# Patient Record
Sex: MALE | Race: WHITE
[De-identification: names, ages, dates, MRNs, and addresses within clinical notes are randomized per-mention and may not be internally consistent; named-entity substitution may affect disease eponyms.]

---

## 2018-11-07 ENCOUNTER — HOSPITAL ENCOUNTER (INPATIENT)
Dept: HOSPITAL 31 - C.ER | Age: 53
LOS: 5 days | Discharge: HOME | DRG: 772 | End: 2018-11-12
Attending: INTERNAL MEDICINE | Admitting: INTERNAL MEDICINE
Payer: MEDICAID

## 2018-11-07 VITALS — RESPIRATION RATE: 20 BRPM

## 2018-11-07 VITALS — BODY MASS INDEX: 26.6 KG/M2

## 2018-11-07 DIAGNOSIS — E10.649: ICD-10-CM

## 2018-11-07 DIAGNOSIS — R06.82: ICD-10-CM

## 2018-11-07 DIAGNOSIS — I10: ICD-10-CM

## 2018-11-07 DIAGNOSIS — F11.23: Primary | ICD-10-CM

## 2018-11-07 DIAGNOSIS — Z87.891: ICD-10-CM

## 2018-11-07 DIAGNOSIS — Z79.4: ICD-10-CM

## 2018-11-07 LAB
ALBUMIN SERPL-MCNC: 5.4 G/DL (ref 3.5–5)
ALBUMIN/GLOB SERPL: 1.6 {RATIO} (ref 1–2.1)
ALT SERPL-CCNC: 15 U/L (ref 21–72)
AST SERPL-CCNC: 32 U/L (ref 17–59)
BACTERIA #/AREA URNS HPF: (no result) /[HPF]
BASOPHILS # BLD AUTO: 0 K/UL (ref 0–0.2)
BASOPHILS NFR BLD: 0.5 % (ref 0–2)
BILIRUB UR-MCNC: NEGATIVE MG/DL
BUN SERPL-MCNC: 33 MG/DL (ref 9–20)
CALCIUM SERPL-MCNC: 10.5 MG/DL (ref 8.6–10.4)
EOSINOPHIL # BLD AUTO: 0.3 K/UL (ref 0–0.7)
EOSINOPHIL NFR BLD: 3.2 % (ref 0–4)
ERYTHROCYTE [DISTWIDTH] IN BLOOD BY AUTOMATED COUNT: 14 % (ref 11.5–14.5)
GFR NON-AFRICAN AMERICAN: 49
GLUCOSE UR STRIP-MCNC: (no result) MG/DL
HGB BLD-MCNC: 13.8 G/DL (ref 12–18)
HYALINE CASTS #/AREA URNS LPF: >20 /LPF (ref 0–2)
LEUKOCYTE ESTERASE UR-ACNC: (no result) LEU/UL
LYMPHOCYTES # BLD AUTO: 3.5 K/UL (ref 1–4.3)
LYMPHOCYTES NFR BLD AUTO: 39 % (ref 20–40)
MCH RBC QN AUTO: 30.4 PG (ref 27–31)
MCHC RBC AUTO-ENTMCNC: 33.4 G/DL (ref 33–37)
MCV RBC AUTO: 91.1 FL (ref 80–94)
MONOCYTES # BLD: 0.8 K/UL (ref 0–0.8)
MONOCYTES NFR BLD: 9.3 % (ref 0–10)
NEUTROPHILS # BLD: 4.3 K/UL (ref 1.8–7)
NEUTROPHILS NFR BLD AUTO: 48 % (ref 50–75)
NRBC BLD AUTO-RTO: 0 % (ref 0–2)
PH UR STRIP: 5 [PH] (ref 5–8)
PLATELET # BLD: 303 K/UL (ref 130–400)
PMV BLD AUTO: 8.1 FL (ref 7.2–11.7)
PROT UR STRIP-MCNC: (no result) MG/DL
RBC # BLD AUTO: 4.54 MIL/UL (ref 4.4–5.9)
RBC # UR STRIP: NEGATIVE /UL
SP GR UR STRIP: 1.02 (ref 1–1.03)
SQUAMOUS EPITHIAL: 1 /HPF (ref 0–5)
UROBILINOGEN UR-MCNC: NORMAL MG/DL (ref 0.2–1)
WBC # BLD AUTO: 9.1 K/UL (ref 4.8–10.8)

## 2018-11-07 PROCEDURE — HZ2ZZZZ DETOXIFICATION SERVICES FOR SUBSTANCE ABUSE TREATMENT: ICD-10-PCS | Performed by: PSYCHIATRY & NEUROLOGY

## 2018-11-07 PROCEDURE — HZ56ZZZ INDIVIDUAL PSYCHOTHERAPY FOR SUBSTANCE ABUSE TREATMENT, PSYCHOEDUCATION: ICD-10-PCS | Performed by: PSYCHIATRY & NEUROLOGY

## 2018-11-07 PROCEDURE — HZ81ZZZ MEDICATION MANAGEMENT FOR SUBSTANCE ABUSE TREATMENT, METHADONE MAINTENANCE: ICD-10-PCS | Performed by: PSYCHIATRY & NEUROLOGY

## 2018-11-07 PROCEDURE — HZ52ZZZ INDIVIDUAL PSYCHOTHERAPY FOR SUBSTANCE ABUSE TREATMENT, COGNITIVE-BEHAVIORAL: ICD-10-PCS | Performed by: PSYCHIATRY & NEUROLOGY

## 2018-11-07 NOTE — RAD
HISTORY:

Psych



COMPARISON:

Chest x-ray performed 1/18/15 



TECHNIQUE:

Chest, one view.



FINDINGS:

Examination limited by habitus and hypoinflation.



LUNGS:

Mild left basilar atelectasis. 



Please note that chest x-ray has limited sensitivity for the 

detection of pulmonary masses.



PLEURA:

No significant pleural effusion identified. No definite pneumothorax .



CARDIOVASCULAR:

Heart size appears top normal, partially obscured.  Ectatic aorta.  

No significant atherosclerotic calcification present. 



OSSEOUS STRUCTURES:

Degenerative changes of the spine.



VISUALIZED UPPER ABDOMEN:

Unremarkable.



OTHER FINDINGS:

None.



IMPRESSION:

Hypoinflation.  Mild left basilar atelectasis.

## 2018-11-07 NOTE — CP.PCM.CON
Past Patient History





- Infectious Disease


Hx of Infectious Diseases: None





- Past Medical History & Family History


Past Medical History?: Yes





- Past Social History


Smoking Status: Light Smoker < 10 Cigarettes Daily





- CARDIAC


Hx Hypertension: Yes





- ENDOCRINE/METABOLIC


Hx Diabetes Mellitus Type 1: Yes





- MUSCULOSKELETAL/RHEUMATOLOGICAL


Hx Falls: No





- PSYCHIATRIC


Hx Substance Use: Yes





- ANESTHESIA


Hx Anesthesia: No


Hx Anesthesia Reactions: No


Hx Malignant Hyperthermia: No


Has any member of the family had a problem w/ anesthesia?: No





Meds


Allergies/Adverse Reactions: 


                                    Allergies











Allergy/AdvReac Type Severity Reaction Status Date / Time


 


No Known Allergies Allergy   Unverified 01/18/15 06:35














Physical Exam





- Constitutional


Appears: Well





- Head Exam


Head Exam: ATRAUMATIC, NORMAL INSPECTION, NORMOCEPHALIC





- Eye Exam


Eye Exam: EOMI, Normal appearance, PERRL


Pupil Exam: NORMAL ACCOMODATION, PERRL





- ENT Exam


ENT Exam: Mucous Membranes Moist, Normal Exam





- Neck Exam


Neck exam: Positive for: Normal Inspection





- Respiratory Exam


Respiratory Exam: Decreased Breath Sounds





- Cardiovascular Exam


Cardiovascular Exam: REGULAR RHYTHM, +S1, +S2





- GI/Abdominal Exam


GI & Abdominal Exam: Diminished Bowel Sounds, Soft





- Rectal Exam


Rectal Exam: Deferred





Results





- Vital Signs


Recent Vital Signs: 


                                Last Vital Signs











Temp  98.1 F   11/07/18 18:20


 


Pulse  84   11/07/18 18:20


 


Resp  20   11/07/18 18:20


 


BP  122/74   11/07/18 18:20


 


Pulse Ox  98   11/07/18 18:20














- Labs


Result Diagrams: 


                                 11/07/18 15:23





                                 11/07/18 15:23


Labs: 


                         Laboratory Results - last 24 hr











  11/07/18 11/07/18 11/07/18





  15:17 15:19 15:23


 


WBC    9.1  D


 


RBC    4.54


 


Hgb    13.8


 


Hct    41.3


 


MCV    91.1


 


MCH    30.4


 


MCHC    33.4


 


RDW    14.0


 


Plt Count    303


 


MPV    8.1


 


Neut % (Auto)    48.0 L


 


Lymph % (Auto)    39.0


 


Mono % (Auto)    9.3


 


Eos % (Auto)    3.2


 


Baso % (Auto)    0.5


 


Neut # (Auto)    4.3


 


Lymph # (Auto)    3.5


 


Mono # (Auto)    0.8


 


Eos # (Auto)    0.3


 


Baso # (Auto)    0.0


 


Sodium   


 


Potassium   


 


Chloride   


 


Carbon Dioxide   


 


Anion Gap   


 


BUN   


 


Creatinine   


 


Est GFR ( Amer)   


 


Est GFR (Non-Af Amer)   


 


POC Glucose (mg/dL)  59 L  51 L 


 


Random Glucose   


 


Calcium   


 


Phosphorus   


 


Magnesium   


 


Total Bilirubin   


 


AST   


 


ALT   


 


Alkaline Phosphatase   


 


Total Creatine Kinase   


 


Troponin I   


 


Total Protein   


 


Albumin   


 


Globulin   


 


Albumin/Globulin Ratio   


 


Urine Color   


 


Urine Clarity   


 


Urine pH   


 


Ur Specific Gravity   


 


Urine Protein   


 


Urine Glucose (UA)   


 


Urine Ketones   


 


Urine Blood   


 


Urine Nitrate   


 


Urine Bilirubin   


 


Urine Urobilinogen   


 


Ur Leukocyte Esterase   


 


Urine WBC (Auto)   


 


Urine RBC (Auto)   


 


Ur Squamous Epith Cells   


 


Urine Bacteria   


 


Hyaline Casts   


 


Urine Opiates Screen   


 


Urine Methadone Screen   


 


Ur Barbiturates Screen   


 


Ur Phencyclidine Scrn   


 


Ur Amphetamines Screen   


 


U Benzodiazepines Scrn   


 


U Oth Cocaine Metabols   


 


U Cannabinoids Screen   


 


Alcohol, Quantitative   














  11/07/18 11/07/18 11/07/18





  15:23 15:41 15:41


 


WBC   


 


RBC   


 


Hgb   


 


Hct   


 


MCV   


 


MCH   


 


MCHC   


 


RDW   


 


Plt Count   


 


MPV   


 


Neut % (Auto)   


 


Lymph % (Auto)   


 


Mono % (Auto)   


 


Eos % (Auto)   


 


Baso % (Auto)   


 


Neut # (Auto)   


 


Lymph # (Auto)   


 


Mono # (Auto)   


 


Eos # (Auto)   


 


Baso # (Auto)   


 


Sodium  145  


 


Potassium  4.5  


 


Chloride  103  


 


Carbon Dioxide  23  


 


Anion Gap  24 H  


 


BUN  33 H  


 


Creatinine  1.5  


 


Est GFR ( Amer)  59  


 


Est GFR (Non-Af Amer)  49  


 


POC Glucose (mg/dL)   


 


Random Glucose  65 L  


 


Calcium  10.5 H  


 


Phosphorus  7.0 H  


 


Magnesium  2.2  


 


Total Bilirubin  0.5  


 


AST  32  


 


ALT  15 L D  


 


Alkaline Phosphatase  85  


 


Total Creatine Kinase  231 H  


 


Troponin I  < 0.0120  


 


Total Protein  8.8 H  


 


Albumin  5.4 H D  


 


Globulin  3.4  


 


Albumin/Globulin Ratio  1.6  


 


Urine Color    Tisha


 


Urine Clarity    Hazy


 


Urine pH    5.0


 


Ur Specific Gravity    1.024


 


Urine Protein    1+ H


 


Urine Glucose (UA)    3+ H


 


Urine Ketones    Negative


 


Urine Blood    Negative


 


Urine Nitrate    Negative


 


Urine Bilirubin    Negative


 


Urine Urobilinogen    Normal


 


Ur Leukocyte Esterase    Neg


 


Urine WBC (Auto)    4


 


Urine RBC (Auto)    4 H


 


Ur Squamous Epith Cells    1


 


Urine Bacteria    Rare


 


Hyaline Casts    >20 H


 


Urine Opiates Screen   Positive H 


 


Urine Methadone Screen   Negative 


 


Ur Barbiturates Screen   Negative 


 


Ur Phencyclidine Scrn   Negative 


 


Ur Amphetamines Screen   Negative 


 


U Benzodiazepines Scrn   Negative 


 


U Oth Cocaine Metabols   Negative 


 


U Cannabinoids Screen   Negative 


 


Alcohol, Quantitative  < 10  














  11/07/18 11/07/18 11/07/18





  15:47 15:50 16:27


 


WBC   


 


RBC   


 


Hgb   


 


Hct   


 


MCV   


 


MCH   


 


MCHC   


 


RDW   


 


Plt Count   


 


MPV   


 


Neut % (Auto)   


 


Lymph % (Auto)   


 


Mono % (Auto)   


 


Eos % (Auto)   


 


Baso % (Auto)   


 


Neut # (Auto)   


 


Lymph # (Auto)   


 


Mono # (Auto)   


 


Eos # (Auto)   


 


Baso # (Auto)   


 


Sodium   


 


Potassium   


 


Chloride   


 


Carbon Dioxide   


 


Anion Gap   


 


BUN   


 


Creatinine   


 


Est GFR ( Amer)   


 


Est GFR (Non-Af Amer)   


 


POC Glucose (mg/dL)  54 L  54 L  124 H


 


Random Glucose   


 


Calcium   


 


Phosphorus   


 


Magnesium   


 


Total Bilirubin   


 


AST   


 


ALT   


 


Alkaline Phosphatase   


 


Total Creatine Kinase   


 


Troponin I   


 


Total Protein   


 


Albumin   


 


Globulin   


 


Albumin/Globulin Ratio   


 


Urine Color   


 


Urine Clarity   


 


Urine pH   


 


Ur Specific Gravity   


 


Urine Protein   


 


Urine Glucose (UA)   


 


Urine Ketones   


 


Urine Blood   


 


Urine Nitrate   


 


Urine Bilirubin   


 


Urine Urobilinogen   


 


Ur Leukocyte Esterase   


 


Urine WBC (Auto)   


 


Urine RBC (Auto)   


 


Ur Squamous Epith Cells   


 


Urine Bacteria   


 


Hyaline Casts   


 


Urine Opiates Screen   


 


Urine Methadone Screen   


 


Ur Barbiturates Screen   


 


Ur Phencyclidine Scrn   


 


Ur Amphetamines Screen   


 


U Benzodiazepines Scrn   


 


U Oth Cocaine Metabols   


 


U Cannabinoids Screen   


 


Alcohol, Quantitative

## 2018-11-07 NOTE — CP.PCM.HP
Past Patient History





- Infectious Disease


Hx of Infectious Diseases: None





- Past Medical History & Family History


Past Medical History?: Yes





- Past Social History


Smoking Status: Light Smoker < 10 Cigarettes Daily





- CARDIAC


Hx Hypertension: Yes





- ENDOCRINE/METABOLIC


Hx Diabetes Mellitus Type 1: Yes





- MUSCULOSKELETAL/RHEUMATOLOGICAL


Hx Falls: No





- PSYCHIATRIC


Hx Substance Use: Yes





- ANESTHESIA


Hx Anesthesia: No


Hx Anesthesia Reactions: No


Hx Malignant Hyperthermia: No


Has any member of the family had a problem w/ anesthesia?: No





Meds


Allergies/Adverse Reactions: 


                                    Allergies











Allergy/AdvReac Type Severity Reaction Status Date / Time


 


No Known Allergies Allergy   Unverified 01/18/15 06:35














Results





- Vital Signs


Recent Vital Signs: 





                                Last Vital Signs











Temp  98.1 F   11/07/18 18:20


 


Pulse  84   11/07/18 18:20


 


Resp  20   11/07/18 18:20


 


BP  122/74   11/07/18 18:20


 


Pulse Ox  98   11/07/18 18:20














- Labs


Result Diagrams: 


                                 11/07/18 15:23





                                 11/07/18 15:23


Labs: 





                         Laboratory Results - last 24 hr











  11/07/18 11/07/18 11/07/18





  15:17 15:19 15:23


 


WBC    9.1  D


 


RBC    4.54


 


Hgb    13.8


 


Hct    41.3


 


MCV    91.1


 


MCH    30.4


 


MCHC    33.4


 


RDW    14.0


 


Plt Count    303


 


MPV    8.1


 


Neut % (Auto)    48.0 L


 


Lymph % (Auto)    39.0


 


Mono % (Auto)    9.3


 


Eos % (Auto)    3.2


 


Baso % (Auto)    0.5


 


Neut # (Auto)    4.3


 


Lymph # (Auto)    3.5


 


Mono # (Auto)    0.8


 


Eos # (Auto)    0.3


 


Baso # (Auto)    0.0


 


Sodium   


 


Potassium   


 


Chloride   


 


Carbon Dioxide   


 


Anion Gap   


 


BUN   


 


Creatinine   


 


Est GFR ( Amer)   


 


Est GFR (Non-Af Amer)   


 


POC Glucose (mg/dL)  59 L  51 L 


 


Random Glucose   


 


Calcium   


 


Phosphorus   


 


Magnesium   


 


Total Bilirubin   


 


AST   


 


ALT   


 


Alkaline Phosphatase   


 


Total Creatine Kinase   


 


Troponin I   


 


Total Protein   


 


Albumin   


 


Globulin   


 


Albumin/Globulin Ratio   


 


Urine Color   


 


Urine Clarity   


 


Urine pH   


 


Ur Specific Gravity   


 


Urine Protein   


 


Urine Glucose (UA)   


 


Urine Ketones   


 


Urine Blood   


 


Urine Nitrate   


 


Urine Bilirubin   


 


Urine Urobilinogen   


 


Ur Leukocyte Esterase   


 


Urine WBC (Auto)   


 


Urine RBC (Auto)   


 


Ur Squamous Epith Cells   


 


Urine Bacteria   


 


Hyaline Casts   


 


Urine Opiates Screen   


 


Urine Methadone Screen   


 


Ur Barbiturates Screen   


 


Ur Phencyclidine Scrn   


 


Ur Amphetamines Screen   


 


U Benzodiazepines Scrn   


 


U Oth Cocaine Metabols   


 


U Cannabinoids Screen   


 


Alcohol, Quantitative   














  11/07/18 11/07/18 11/07/18





  15:23 15:41 15:41


 


WBC   


 


RBC   


 


Hgb   


 


Hct   


 


MCV   


 


MCH   


 


MCHC   


 


RDW   


 


Plt Count   


 


MPV   


 


Neut % (Auto)   


 


Lymph % (Auto)   


 


Mono % (Auto)   


 


Eos % (Auto)   


 


Baso % (Auto)   


 


Neut # (Auto)   


 


Lymph # (Auto)   


 


Mono # (Auto)   


 


Eos # (Auto)   


 


Baso # (Auto)   


 


Sodium  145  


 


Potassium  4.5  


 


Chloride  103  


 


Carbon Dioxide  23  


 


Anion Gap  24 H  


 


BUN  33 H  


 


Creatinine  1.5  


 


Est GFR ( Amer)  59  


 


Est GFR (Non-Af Amer)  49  


 


POC Glucose (mg/dL)   


 


Random Glucose  65 L  


 


Calcium  10.5 H  


 


Phosphorus  7.0 H  


 


Magnesium  2.2  


 


Total Bilirubin  0.5  


 


AST  32  


 


ALT  15 L D  


 


Alkaline Phosphatase  85  


 


Total Creatine Kinase  231 H  


 


Troponin I  < 0.0120  


 


Total Protein  8.8 H  


 


Albumin  5.4 H D  


 


Globulin  3.4  


 


Albumin/Globulin Ratio  1.6  


 


Urine Color    Tisha


 


Urine Clarity    Hazy


 


Urine pH    5.0


 


Ur Specific Gravity    1.024


 


Urine Protein    1+ H


 


Urine Glucose (UA)    3+ H


 


Urine Ketones    Negative


 


Urine Blood    Negative


 


Urine Nitrate    Negative


 


Urine Bilirubin    Negative


 


Urine Urobilinogen    Normal


 


Ur Leukocyte Esterase    Neg


 


Urine WBC (Auto)    4


 


Urine RBC (Auto)    4 H


 


Ur Squamous Epith Cells    1


 


Urine Bacteria    Rare


 


Hyaline Casts    >20 H


 


Urine Opiates Screen   Positive H 


 


Urine Methadone Screen   Negative 


 


Ur Barbiturates Screen   Negative 


 


Ur Phencyclidine Scrn   Negative 


 


Ur Amphetamines Screen   Negative 


 


U Benzodiazepines Scrn   Negative 


 


U Oth Cocaine Metabols   Negative 


 


U Cannabinoids Screen   Negative 


 


Alcohol, Quantitative  < 10  














  11/07/18 11/07/18 11/07/18





  15:47 15:50 16:27


 


WBC   


 


RBC   


 


Hgb   


 


Hct   


 


MCV   


 


MCH   


 


MCHC   


 


RDW   


 


Plt Count   


 


MPV   


 


Neut % (Auto)   


 


Lymph % (Auto)   


 


Mono % (Auto)   


 


Eos % (Auto)   


 


Baso % (Auto)   


 


Neut # (Auto)   


 


Lymph # (Auto)   


 


Mono # (Auto)   


 


Eos # (Auto)   


 


Baso # (Auto)   


 


Sodium   


 


Potassium   


 


Chloride   


 


Carbon Dioxide   


 


Anion Gap   


 


BUN   


 


Creatinine   


 


Est GFR ( Amer)   


 


Est GFR (Non-Af Amer)   


 


POC Glucose (mg/dL)  54 L  54 L  124 H


 


Random Glucose   


 


Calcium   


 


Phosphorus   


 


Magnesium   


 


Total Bilirubin   


 


AST   


 


ALT   


 


Alkaline Phosphatase   


 


Total Creatine Kinase   


 


Troponin I   


 


Total Protein   


 


Albumin   


 


Globulin   


 


Albumin/Globulin Ratio   


 


Urine Color   


 


Urine Clarity   


 


Urine pH   


 


Ur Specific Gravity   


 


Urine Protein   


 


Urine Glucose (UA)   


 


Urine Ketones   


 


Urine Blood   


 


Urine Nitrate   


 


Urine Bilirubin   


 


Urine Urobilinogen   


 


Ur Leukocyte Esterase   


 


Urine WBC (Auto)   


 


Urine RBC (Auto)   


 


Ur Squamous Epith Cells   


 


Urine Bacteria   


 


Hyaline Casts   


 


Urine Opiates Screen   


 


Urine Methadone Screen   


 


Ur Barbiturates Screen   


 


Ur Phencyclidine Scrn   


 


Ur Amphetamines Screen   


 


U Benzodiazepines Scrn   


 


U Oth Cocaine Metabols   


 


U Cannabinoids Screen   


 


Alcohol, Quantitative   














  11/07/18





  21:34


 


WBC 


 


RBC 


 


Hgb 


 


Hct 


 


MCV 


 


MCH 


 


MCHC 


 


RDW 


 


Plt Count 


 


MPV 


 


Neut % (Auto) 


 


Lymph % (Auto) 


 


Mono % (Auto) 


 


Eos % (Auto) 


 


Baso % (Auto) 


 


Neut # (Auto) 


 


Lymph # (Auto) 


 


Mono # (Auto) 


 


Eos # (Auto) 


 


Baso # (Auto) 


 


Sodium 


 


Potassium 


 


Chloride 


 


Carbon Dioxide 


 


Anion Gap 


 


BUN 


 


Creatinine 


 


Est GFR ( Amer) 


 


Est GFR (Non-Af Amer) 


 


POC Glucose (mg/dL)  199 H


 


Random Glucose 


 


Calcium 


 


Phosphorus 


 


Magnesium 


 


Total Bilirubin 


 


AST 


 


ALT 


 


Alkaline Phosphatase 


 


Total Creatine Kinase 


 


Troponin I 


 


Total Protein 


 


Albumin 


 


Globulin 


 


Albumin/Globulin Ratio 


 


Urine Color 


 


Urine Clarity 


 


Urine pH 


 


Ur Specific Gravity 


 


Urine Protein 


 


Urine Glucose (UA) 


 


Urine Ketones 


 


Urine Blood 


 


Urine Nitrate 


 


Urine Bilirubin 


 


Urine Urobilinogen 


 


Ur Leukocyte Esterase 


 


Urine WBC (Auto) 


 


Urine RBC (Auto) 


 


Ur Squamous Epith Cells 


 


Urine Bacteria 


 


Hyaline Casts 


 


Urine Opiates Screen 


 


Urine Methadone Screen 


 


Ur Barbiturates Screen 


 


Ur Phencyclidine Scrn 


 


Ur Amphetamines Screen 


 


U Benzodiazepines Scrn 


 


U Oth Cocaine Metabols 


 


U Cannabinoids Screen 


 


Alcohol, Quantitative

## 2018-11-07 NOTE — C.PDOC
History Of Present Illness





53 year old male with a history of hypertension and substance abuse presents to 

the ED for detox. Patients admits to using heroine in the waiting room. Patient 

is tachycardic and mildly tachypneic, noted by be hypoglycemic on arrival. 

dextrose given. triage b/p low, however immediate b/p bedside normal.  No other 

further complaints at this time. 





Time Seen by Provider: 18 15:01


Chief Complaint (Nursing): Substance Abuse


History Per: Patient


History/Exam Limitations: no limitations





Past Medical History


Reviewed: Historical Data, Nursing Documentation, Vital Signs


Vital Signs: 





                                Last Vital Signs











Temp  98.2 F   18 15:06


 


Pulse  135 H  18 15:06


 


Resp  24   18 15:06


 


BP      


 


Pulse Ox  94 L  18 15:06














- Medical History


PMH: Diabetes, HTN





- [facility] Procedures











INFLUENZA VACCINATION (01/18/15)








Family History: States: Unknown Family Hx (no pertinent family history)





- Social History


Hx Tobacco Use: Yes (stoped dec)


Hx Alcohol Use: No


Hx Substance Use: Yes (heroin)





- Immunization History


Hx Tetanus Toxoid Vaccination: No


Hx Influenza Vaccination: Yes


Hx Pneumococcal Vaccination: No





Review Of Systems


Except As Marked, All Systems Reviewed And Found Negative.


Cardiovascular: Positive for: Other (tachycardic. )


Respiratory: Positive for: Other (mildly tachypneic.)


Psych: Positive for: Other (detox. heroine use.)





Physical Exam





- Physical Exam


Appears: Other (anxious appearing.)


Skin: Normal Color, Warm, Dry


Head: Atraumatic, Normacephalic


Eye(s): bilateral: Normal Inspection


Oral Mucosa: Moist


Neck: Normal ROM, Supple


Chest: Symmetrical, No Deformity


Cardiovascular: No Murmur, Other (tachycardic.)


Respiratory: Normal Breath Sounds, No Rales, No Rhonchi, No Wheezing


Gastrointestinal/Abdominal: Normal Exam, Soft, No Tenderness


Neurological/Psych: Oriented x3, Normal Speech





ED Course And Treatment





- Laboratory Results


Result Diagrams: 


                                 18 15:23





                                 18 15:23


ECG Rhythm: Sinus Tachycardia


Rate From EC


O2 Sat by Pulse Oximetry: 94 (RA)


Pulse Ox Interpretation: Abnormal





- Other Rad


  ** CXR


X-Ray: Viewed By Me, Read By Radiologist


Interpretation: FINDINGS:  Examination limited by habitus and hypoinflation.  

LUNGS:  Mild left basilar atelectasis.  Please note that chest x-ray has limited

sensitivity for the detection of pulmonary masses.  PLEURA:  No significant 

pleural effusion identified. No definite pneumothorax .  CARDIOVASCULAR:  Heart 

size appears top normal, partially obscured.  Ectatic aorta.  No significant 

atherosclerotic calcification present.  OSSEOUS STRUCTURES:  Degenerative 

changes of the spine.  VISUALIZED UPPER ABDOMEN:  Unremarkable.  OTHER FINDINGS:

 None.  IMPRESSION:  Hypoinflation.  Mild left basilar atelectasis.





Medical Decision Making


Medical Decision Making: 





Plan: 


-EKG 


-Blood sent. 


-CXR 


-Glucose POC 


-Dextrose 5% 


-Urinalysis








sp dextrose on long acting insuing. accpeted by dr anne for obs. cannot 

medically clear. 





Disposition





- Disposition


Disposition: HOSPITALIZED


Disposition Time: 17:00


Condition: FAIR





- Clinical Impression


Clinical Impression: 


 Hypoglycemia, Desire for detoxification








- Scribe Statement


The provider has reviewed the documentation as recorded by the Scribe (Kendy Zavala)


Provider Attestation: 





All medical record entries made by the Scribe were at my direction and 

personally dictated by me. I have reviewed the chart and agree that the record 

accurately reflects my personal performance of the history, physical exam, 

medical decision making, and the department course for this patient. I have also

personally directed, reviewed, and agree with the discharge instructions and 

disposition.








Decision To Admit





- Pt Status Changed To:


Hospital Disposition Of: Inpatient





- Admit Certification


Admit to Inpatient:: After my assessment, the patient will require 

hospitalization for at least two midnights.  This is because of the severity of 

symptoms shown, intensity of services needed, and/or the medical risk in this 

patient being treated as an outpatient.





- InPatient:


Physician Admission Certification: I certify that this patient requires 2 or 

more midnights of care for the following reason:: needs detox and medical 

clearance.





- .


Bed Request Type: Telemetry


Admitting Physician: Irish Quiñonez


Patient Diagnosis: 


 Hypoglycemia, Desire for detoxification

## 2018-11-08 RX ADMIN — HUMAN INSULIN SCH UNITS: 100 INJECTION, SOLUTION SUBCUTANEOUS at 12:17

## 2018-11-08 RX ADMIN — ENOXAPARIN SODIUM SCH MG: 40 INJECTION SUBCUTANEOUS at 09:19

## 2018-11-08 RX ADMIN — HUMAN INSULIN SCH: 100 INJECTION, SOLUTION SUBCUTANEOUS at 22:16

## 2018-11-08 RX ADMIN — HUMAN INSULIN SCH UNITS: 100 INJECTION, SOLUTION SUBCUTANEOUS at 17:30

## 2018-11-08 NOTE — CP.PCM.PN
Subjective





- Date & Time of Evaluation


Date of Evaluation: 11/08/18


Time of Evaluation: 11:00





- Subjective


Subjective: 


clinically same





Objective





- Vital Signs/Intake and Output


Vital Signs (last 24 hours): 


                                        











Temp Pulse Resp BP Pulse Ox


 


 98.1 F   99 H  20   129/79   99 


 


 11/08/18 12:35  11/08/18 12:35  11/08/18 12:35  11/08/18 12:35  11/08/18 12:35











- Medications


Medications: 


                               Current Medications





Enoxaparin Sodium (Lovenox)  40 mg SC DAILY [facility]


   Last Admin: 11/08/18 09:19 Dose:  40 mg


Dextrose/Sodium Chloride (Dextrose 5%/0.45% Ns 1000 Ml)  1,000 mls @ 50 mls/hr 

IV .Q20H [facility]


   Last Admin: 11/08/18 01:05 Dose:  50 mls/hr


Insulin Human Regular (Novolin R)  0 unit SC ACHS [facility]; Protocol


   Last Admin: 11/08/18 12:17 Dose:  10 units


Pantoprazole Sodium (Protonix Inj)  40 mg IVP DAILY [facility]


   Last Admin: 11/08/18 09:19 Dose:  40 mg











- Labs


Labs: 


                                        





                                 11/07/18 15:23 





                                 11/07/18 15:23 











- Constitutional


Appears: Well





- Head Exam


Head Exam: ATRAUMATIC, NORMAL INSPECTION, NORMOCEPHALIC





- Eye Exam


Eye Exam: EOMI, Normal appearance, PERRL


Pupil Exam: NORMAL ACCOMODATION, PERRL





- ENT Exam


ENT Exam: Mucous Membranes Moist, Normal Exam





- Neck Exam


Neck Exam: Full ROM, Normal Inspection.  absent: Lymphadenopathy





- Respiratory Exam


Respiratory Exam: Decreased Breath Sounds





- Cardiovascular Exam


Cardiovascular Exam: REGULAR RHYTHM, +S1, +S2





- GI/Abdominal Exam


GI & Abdominal Exam: Soft, Diminished Bowel Sounds





- Rectal Exam


Rectal Exam: Deferred

## 2018-11-08 NOTE — CARD
--------------- APPROVED REPORT --------------





Date of service: 11/07/2018



EKG Measurement

Heart [id number]

VA [id number]

[id number]

[id number]

[id number]



<Conclusion>

Sinus tachycardia with short VA

Possible Left atrial enlargement

Junctional ST depression, probably normal

Borderline ECG

## 2018-11-09 LAB
ALBUMIN SERPL-MCNC: 4.4 G/DL (ref 3.5–5)
ALBUMIN/GLOB SERPL: 1.5 {RATIO} (ref 1–2.1)
ALT SERPL-CCNC: 25 U/L (ref 21–72)
AST SERPL-CCNC: 19 U/L (ref 17–59)
BASOPHILS # BLD AUTO: 0 K/UL (ref 0–0.2)
BASOPHILS NFR BLD: 0.1 % (ref 0–2)
BUN SERPL-MCNC: 24 MG/DL (ref 9–20)
BURR CELLS BLD QL SMEAR: SLIGHT
CALCIUM SERPL-MCNC: 9.3 MG/DL (ref 8.6–10.4)
EOSINOPHIL # BLD AUTO: 0 K/UL (ref 0–0.7)
EOSINOPHIL NFR BLD: 0 % (ref 0–4)
EOSINOPHIL NFR BLD: 1 % (ref 0–4)
ERYTHROCYTE [DISTWIDTH] IN BLOOD BY AUTOMATED COUNT: 13.6 % (ref 11.5–14.5)
GFR NON-AFRICAN AMERICAN: > 60
HGB BLD-MCNC: 12.8 G/DL (ref 12–18)
LYMPHOCYTE: 3 % (ref 20–40)
LYMPHOCYTES # BLD AUTO: 0.7 K/UL (ref 1–4.3)
LYMPHOCYTES NFR BLD AUTO: 4.6 % (ref 20–40)
MCH RBC QN AUTO: 30.1 PG (ref 27–31)
MCHC RBC AUTO-ENTMCNC: 33 G/DL (ref 33–37)
MCV RBC AUTO: 91 FL (ref 80–94)
MONOCYTE: 5 % (ref 0–10)
MONOCYTES # BLD: 0.4 K/UL (ref 0–0.8)
MONOCYTES NFR BLD: 2.8 % (ref 0–10)
NEUTROPHILS # BLD: 13.3 K/UL (ref 1.8–7)
NEUTROPHILS NFR BLD AUTO: 91 % (ref 50–75)
NEUTROPHILS NFR BLD AUTO: 92.5 % (ref 50–75)
NRBC BLD AUTO-RTO: 0 % (ref 0–2)
PLATELET # BLD EST: NORMAL 10*3/UL
PLATELET # BLD: 235 K/UL (ref 130–400)
PMV BLD AUTO: 9.2 FL (ref 7.2–11.7)
RBC # BLD AUTO: 4.25 MIL/UL (ref 4.4–5.9)
TOTAL CELLS COUNTED BLD: 100
WBC # BLD AUTO: 14.4 K/UL (ref 4.8–10.8)

## 2018-11-09 RX ADMIN — HUMAN INSULIN SCH UNITS: 100 INJECTION, SUSPENSION SUBCUTANEOUS at 18:30

## 2018-11-09 RX ADMIN — HUMAN INSULIN SCH UNITS: 100 INJECTION, SOLUTION SUBCUTANEOUS at 07:58

## 2018-11-09 RX ADMIN — HUMAN INSULIN SCH: 100 INJECTION, SOLUTION SUBCUTANEOUS at 15:50

## 2018-11-09 RX ADMIN — HUMAN INSULIN SCH UNITS: 100 INJECTION, SOLUTION SUBCUTANEOUS at 22:49

## 2018-11-09 RX ADMIN — HUMAN INSULIN SCH UNITS: 100 INJECTION, SOLUTION SUBCUTANEOUS at 18:30

## 2018-11-09 RX ADMIN — ENOXAPARIN SODIUM SCH MG: 40 INJECTION SUBCUTANEOUS at 08:59

## 2018-11-09 NOTE — PCM.PSYCH
Initial Psychiatric Evaluation





- Initial Psychiatric Evaluation


Type of Admission: Voluntary


Legal Status: Capacity


Chief Complaint (in patient's own words): 





I am withdrawing.'


History of Present Illness and Precipitating Events: 








Pt is a 53 year old male who is single, with one child who is 30 years old and a

grandchild that is 5 years old. 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 

work in construction. He currently lives with his sister. Pt came to [facility] for 

detoxification. Today pt was consulted by psychiatry. 





Pt states he uses 4-5 bags of heroin a day by snorting. He began using when he 

was 18 years old but it became regular 5-10 years ago. The last time he used was

the day of admission in the waiting room. He states he wants detoxification now 

because he is "tired of it." He was on methadone maintenance in 2016 for 6 mo

nths but was not a fan of it. He has undergone detoxification once and 

rehabilitation once for 90 days at [facility]. The rehabilitation was not by 

choice but because he was on parole and sent there. He denies any 

hospitalizations due to a psychiatric condition. His plan after his stay in the 

hospital is to go home or to [place]. He smoke 7-8 cigarettes day since 1995. He

denies use of other substances including cocaine, marijuana, benzodiazepines, or

alcohol. He denies SI, HI or hallucinations. He states that the withdrawal 

symptoms are beginning and he can tell when the yawning begins. Though he is not

feeling them now he soon will feel tremors, chills, vomiting, body aches, 

anxiety, and irritation.





Past medical history: Diabetes, hypertension


Allergies: Denies


Surgical history: Denies


Legal history: Denies


Psychiatric history: Denies


Family psychiatric history: Denies


Current Medications: 





Active Medications











Generic Name Dose Route Start Last Admin





  Trade Name Freq  PRN Reason Stop Dose Admin


 


Enoxaparin Sodium  40 mg  11/08/18 10:00  11/08/18 09:19





  Lovenox  SC   40 mg





  DAILY [facility]   Administration





     





     





     





     


 


Dextrose/Sodium Chloride  1,000 mls @ 50 mls/hr  11/08/18 01:15  11/08/18 01:05





  Dextrose 5%/0.45% Ns 1000 Ml  IV   50 mls/hr





  .Q20H LIDIA   Administration





     





     





     





     


 


Insulin Human Regular  0 unit  11/08/18 12:15  11/08/18 22:16





  Novolin R  SC   Not Given





  [facility]   





     





     





  Protocol   





     


 


Pantoprazole Sodium  40 mg  11/08/18 10:00  11/08/18 09:19





  Protonix Inj  IVP   40 mg





  DAILY L[facility]IA   Administration





     





     





     





     














Past Psychiatric History





- Past Psychiatric History


Previous Treatment History: None


Pertinent Medical Hx (Current Medical&Sleep Prob, Allergies): 





                                    Allergies











Allergy/AdvReac Type Severity Reaction Status Date / Time


 


No Known Allergies Allergy   Unverified 01/18/15 06:35








                                        





Methadone 22 mg PO DAILY 01/18/15 


Insulin Glargine, Recombina [Lantus] 12 unit SC BID #1 01/21/15 


Insulin Human Regular [Novolin R] 0 unit SC ACHS PRN #1 ml 01/21/15 











Review of Systems





- Review of Systems


All systems: reviewed and no additional remarkable complaints except





- Psychiatric


Psychiatric: Anxiety, Difficulty Concentrating, Irritability.  absent: Suicidal 

Ideation





Mental Status Examination





- Personal Presentation


Personal Presentation: Looks stated age





- Affect


Affect: Constricted





- Motor Activity


Motor Activity: Calm





- Reliability in Providing Information


Reliability in Providing Information: Fair





- Speech


Speech: Organized





- Mood


Mood: Anxious





- Formal Thought Process


Formal Thought Process: No Impairment





- Obsessions/Compulsions


Obsessions: No


Compulsions: No





- Cognitive Functions


Orientation: Person, Place, Situation, Time


Sensorium: Alert


Attention/Concentration: Attentive


Abstract Thinking: [place]


Estimate of Intelligence: Below average


Judgement: Imparied, as evidence by: Poor judgement, Imparied, as evidence by: 

Lack of insight into illness





- Risk


Risk: Withdrawal, Diminished functioning





- Limitations


Limitations: Living alone





DSM 5 DX





- DSM 5


DSM 5 Diagnosis: 








Opioid use disorder severe


CBT


Psychoeducation


Supportive therapy, individual therapy


Use MI for abstinence





Opioid withdrawal 


CBT   


Psychoeducation


Supportive therapy, individual therapy


Clonidine when necessary


Methadone taper





- Recommended/Plan of Treatment


Treatment Recommendations and Plan of Treatment: 





Opioid use disorder severe


CBT


Psychoeducation


Supportive therapy, individual therapy


Use MI for abstinence





Opioid withdrawal 


CBT   


Psychoeducation


Supportive therapy, individual therapy


Clonidine when necessary


Methadone taper





- Smoking Cessation


Smoking Cessation Initiated: Yes

## 2018-11-09 NOTE — CP.PCM.PN
Subjective





- Date & Time of Evaluation


Date of Evaluation: 11/09/18


Time of Evaluation: 10:45





- Subjective


Subjective: 


clinically same





Objective





- Vital Signs/Intake and Output


Vital Signs (last 24 hours): 


                                        











Temp Pulse Resp BP Pulse Ox


 


 99.1 F   102 H  20   126/65   99 


 


 11/09/18 09:11  11/09/18 09:11  11/09/18 09:11  11/09/18 09:11  11/09/18 09:11








Intake and Output: 


                                        











 11/09/18 11/09/18





 06:59 18:59


 


Intake Total 700 


 


Balance 700 














- Medications


Medications: 


                               Current Medications





Acetaminophen (Tylenol 325mg Tab)  650 mg PO Q4H PRN


   PRN Reason: Fever greater than 101 F


Al Hydrox/Mg Hydrox/Simethicone (Maalox 30 Ml)  30 ml PO TID PRN


   PRN Reason: Indigestion / Heartburn


Clonidine HCl (Catapres)  0.1 mg PO Q8 PRN


   PRN Reason: COWS Score More or Equal to 5


   Last Admin: 11/09/18 02:40 Dose:  0.1 mg


Dextrose (Dextrose 50% Inj)  0 ml IV STAT PRN; Protocol


   PRN Reason: Hypoglycemia Protocol


Dextrose (Glutose 15)  0 gm PO ONCE PRN; Protocol


   PRN Reason: Hypoglycemia Protocol


Enoxaparin Sodium (Lovenox)  40 mg SC DAILY [facility]


   Last Admin: 11/09/18 08:59 Dose:  40 mg


Glucagon (Glucagen Diagnostic Kit)  0 mg IM STAT PRN; Protocol


   PRN Reason: Hypoglycemia Protocol


Dextrose/Sodium Chloride (Dextrose 5%/0.45% Ns 1000 Ml)  1,000 mls @ 50 mls/hr 

IV .Q20H [facility]


   Last Admin: 11/08/18 01:05 Dose:  50 mls/hr


Dextrose (Dextrose 5% In Water 1000 Ml)  1,000 mls @ 0 mls/hr IV .Q0M PRN; 

Protocol


   PRN Reason: Hypoglycemia Protocol


Insulin Human Isoph/Insulin Regular (Novolin 70/30 (70/30 Units/Ml) 10 Ml)  20 

units SC ACBD [facility]


Insulin Human Regular (Novolin R)  0 unit SC ACHS [facility]; Protocol


   Last Admin: 11/09/18 15:50 Dose:  Not Given


Loperamide HCl (Imodium)  2 mg PO Q8 PRN


   PRN Reason: Diarrhea


Methadone HCl (Methadone)  20 mg PO DAILY [facility]; Taper


   Stop: 11/12/18 09:59


   Last Admin: 11/09/18 08:59 Dose:  20 mg


Ondansetron HCl (Zofran Tab)  4 mg PO Q8 PRN


   PRN Reason: Nausea/Vomiting


Pantoprazole Sodium (Protonix Inj)  40 mg IVP DAILY [facility]


   Last Admin: 11/09/18 08:59 Dose:  40 mg


Pseudoephedrine HCl (Sudafed Tab)  60 mg PO QID PRN


   PRN Reason: Nasal/Sinus Congestion


   Last Admin: 11/09/18 04:24 Dose:  60 mg











- Labs


Labs: 


                                        





                                 11/09/18 08:29 





                                 11/09/18 08:29 











- Constitutional


Appears: Well





- Head Exam


Head Exam: ATRAUMATIC, NORMAL INSPECTION, NORMOCEPHALIC





- Eye Exam


Eye Exam: EOMI, Normal appearance, PERRL


Pupil Exam: NORMAL ACCOMODATION, PERRL





- ENT Exam


ENT Exam: Mucous Membranes Moist, Normal Exam





- Neck Exam


Neck Exam: Full ROM, Normal Inspection.  absent: Lymphadenopathy





- Respiratory Exam


Respiratory Exam: Decreased Breath Sounds





- Cardiovascular Exam


Cardiovascular Exam: REGULAR RHYTHM, +S1, +S2





- GI/Abdominal Exam


GI & Abdominal Exam: Soft, Diminished Bowel Sounds





- Rectal Exam


Rectal Exam: Deferred

## 2018-11-10 RX ADMIN — ENOXAPARIN SODIUM SCH MG: 40 INJECTION SUBCUTANEOUS at 10:18

## 2018-11-10 RX ADMIN — HUMAN INSULIN SCH: 100 INJECTION, SOLUTION SUBCUTANEOUS at 22:00

## 2018-11-10 RX ADMIN — HUMAN INSULIN SCH UNITS: 100 INJECTION, SOLUTION SUBCUTANEOUS at 08:17

## 2018-11-10 RX ADMIN — HUMAN INSULIN SCH: 100 INJECTION, SOLUTION SUBCUTANEOUS at 16:30

## 2018-11-10 RX ADMIN — HUMAN INSULIN SCH UNITS: 100 INJECTION, SUSPENSION SUBCUTANEOUS at 08:18

## 2018-11-10 RX ADMIN — HUMAN INSULIN SCH UNITS: 100 INJECTION, SOLUTION SUBCUTANEOUS at 12:41

## 2018-11-10 RX ADMIN — HUMAN INSULIN SCH UNITS: 100 INJECTION, SUSPENSION SUBCUTANEOUS at 17:00

## 2018-11-10 NOTE — CP.PCM.PN
Subjective





- Date & Time of Evaluation


Date of Evaluation: 11/10/18


Time of Evaluation: 10:00





- Subjective


Subjective: 


clinically same





Objective





- Vital Signs/Intake and Output


Vital Signs (last 24 hours): 


                                        











Temp Pulse Resp BP Pulse Ox


 


 98.5 F   93 H  20   130/73   98 


 


 11/10/18 15:17  11/10/18 15:17  11/10/18 15:17  11/10/18 15:17  11/10/18 15:17











- Medications


Medications: 


                               Current Medications





Acetaminophen (Tylenol 325mg Tab)  650 mg PO Q4H PRN


   PRN Reason: Fever greater than 101 F


Al Hydrox/Mg Hydrox/Simethicone (Maalox 30 Ml)  30 ml PO TID PRN


   PRN Reason: Indigestion / Heartburn


Clonidine HCl (Catapres)  0.1 mg PO Q8 PRN


   PRN Reason: COWS Score More or Equal to 5


   Last Admin: 11/09/18 02:40 Dose:  0.1 mg


Dextrose (Dextrose 50% Inj)  0 ml IV STAT PRN; Protocol


   PRN Reason: Hypoglycemia Protocol


Dextrose (Glutose 15)  0 gm PO ONCE PRN; Protocol


   PRN Reason: Hypoglycemia Protocol


Enoxaparin Sodium (Lovenox)  40 mg SC DAILY [facility]


   Last Admin: 11/10/18 10:18 Dose:  40 mg


Glucagon (Glucagen Diagnostic Kit)  0 mg IM STAT PRN; Protocol


   PRN Reason: Hypoglycemia Protocol


Dextrose/Sodium Chloride (Dextrose 5%/0.45% Ns 1000 Ml)  1,000 mls @ 50 mls/hr 

IV .Q20H [facility]


   Last Admin: 11/08/18 01:05 Dose:  50 mls/hr


Dextrose (Dextrose 5% In Water 1000 Ml)  1,000 mls @ 0 mls/hr IV .Q0M PRN; 

Protocol


   PRN Reason: Hypoglycemia Protocol


Insulin Human Isoph/Insulin Regular (Novolin 70/30 (70/30 Units/Ml) 10 Ml)  20 

units SC ACBD [facility]


   Last Admin: 11/10/18 17:00 Dose:  20 units


Insulin Human Regular (Novolin R)  0 unit SC ACHS [facility]; Protocol


   Last Admin: 11/10/18 12:41 Dose:  3 units


Loperamide HCl (Imodium)  2 mg PO Q8 PRN


   PRN Reason: Diarrhea


Methadone HCl (Methadone)  10 mg PO DAILY [facility]; Taper


   Stop: 11/12/18 09:59


   Last Admin: 11/10/18 10:17 Dose:  10 mg


Ondansetron HCl (Zofran Tab)  4 mg PO Q8 PRN


   PRN Reason: Nausea/Vomiting


Pantoprazole Sodium (Protonix Inj)  40 mg IVP DAILY [facility]


   Last Admin: 11/10/18 10:18 Dose:  40 mg


Pseudoephedrine HCl (Sudafed Tab)  60 mg PO QID PRN


   PRN Reason: Nasal/Sinus Congestion


   Last Admin: 11/09/18 04:24 Dose:  60 mg











- Labs


Labs: 


                                        





                                 11/09/18 08:29 





                                 11/09/18 08:29 











- Constitutional


Appears: Well





- Head Exam


Head Exam: ATRAUMATIC, NORMAL INSPECTION, NORMOCEPHALIC





- Eye Exam


Eye Exam: EOMI, Normal appearance, PERRL


Pupil Exam: NORMAL ACCOMODATION, PERRL





- ENT Exam


ENT Exam: Mucous Membranes Moist, Normal Exam





- Neck Exam


Neck Exam: Full ROM, Normal Inspection.  absent: Lymphadenopathy





- Respiratory Exam


Respiratory Exam: Decreased Breath Sounds





- Cardiovascular Exam


Cardiovascular Exam: REGULAR RHYTHM, +S1, +S2





- GI/Abdominal Exam


GI & Abdominal Exam: Soft, Diminished Bowel Sounds





- Rectal Exam


Rectal Exam: Deferred

## 2018-11-11 RX ADMIN — HUMAN INSULIN SCH UNITS: 100 INJECTION, SUSPENSION SUBCUTANEOUS at 17:24

## 2018-11-11 RX ADMIN — HUMAN INSULIN SCH UNITS: 100 INJECTION, SOLUTION SUBCUTANEOUS at 13:07

## 2018-11-11 RX ADMIN — HUMAN INSULIN SCH UNITS: 100 INJECTION, SOLUTION SUBCUTANEOUS at 17:23

## 2018-11-11 RX ADMIN — ENOXAPARIN SODIUM SCH MG: 40 INJECTION SUBCUTANEOUS at 10:17

## 2018-11-11 RX ADMIN — HUMAN INSULIN SCH UNITS: 100 INJECTION, SOLUTION SUBCUTANEOUS at 10:21

## 2018-11-11 RX ADMIN — HUMAN INSULIN SCH UNITS: 100 INJECTION, SUSPENSION SUBCUTANEOUS at 10:22

## 2018-11-11 RX ADMIN — HUMAN INSULIN SCH: 100 INJECTION, SOLUTION SUBCUTANEOUS at 23:00

## 2018-11-11 NOTE — CP.PCM.PN
Subjective





- Date & Time of Evaluation


Date of Evaluation: 11/11/18


Time of Evaluation: 10:15





- Subjective


Subjective: 


clinically same





Objective





- Vital Signs/Intake and Output


Vital Signs (last 24 hours): 


                                        











Temp Pulse Resp BP Pulse Ox


 


 98.1 F   71   20   113/68   97 


 


 11/11/18 07:57  11/11/18 07:57  11/11/18 07:57  11/11/18 07:57  11/11/18 07:57











- Medications


Medications: 


                               Current Medications





Acetaminophen (Tylenol 325mg Tab)  650 mg PO Q4H PRN


   PRN Reason: Fever greater than 101 F


Al Hydrox/Mg Hydrox/Simethicone (Maalox 30 Ml)  30 ml PO TID PRN


   PRN Reason: Indigestion / Heartburn


Clonidine HCl (Catapres)  0.1 mg PO Q8 PRN


   PRN Reason: COWS Score More or Equal to 5


   Last Admin: 11/09/18 02:40 Dose:  0.1 mg


Dextrose (Dextrose 50% Inj)  0 ml IV STAT PRN; Protocol


   PRN Reason: Hypoglycemia Protocol


Dextrose (Glutose 15)  0 gm PO ONCE PRN; Protocol


   PRN Reason: Hypoglycemia Protocol


Enoxaparin Sodium (Lovenox)  40 mg SC DAILY [facility]


   Last Admin: 11/11/18 10:17 Dose:  40 mg


Glucagon (Glucagen Diagnostic Kit)  0 mg IM STAT PRN; Protocol


   PRN Reason: Hypoglycemia Protocol


Dextrose (Dextrose 5% In Water 1000 Ml)  1,000 mls @ 0 mls/hr IV .Q0M PRN; 

Protocol


   PRN Reason: Hypoglycemia Protocol


Insulin Human Isoph/Insulin Regular (Novolin 70/30 (70/30 Units/Ml) 10 Ml)  20 

units SC ACBD [facility]


   Last Admin: 11/11/18 10:22 Dose:  20 units


Insulin Human Regular (Novolin R)  0 unit SC ACHS [facility]; Protocol


   Last Admin: 11/11/18 13:07 Dose:  6 units


Loperamide HCl (Imodium)  2 mg PO Q8 PRN


   PRN Reason: Diarrhea


Methadone HCl (Methadone)  5 mg PO DAILY [facility]; Taper


   Stop: 11/12/18 09:59


   Last Admin: 11/11/18 10:21 Dose:  5 mg


Ondansetron HCl (Zofran Tab)  4 mg PO Q8 PRN


   PRN Reason: Nausea/Vomiting


Pantoprazole Sodium (Protonix Inj)  40 mg IVP DAILY [facility]


   Last Admin: 11/11/18 10:17 Dose:  40 mg


Pseudoephedrine HCl (Sudafed Tab)  60 mg PO QID PRN


   PRN Reason: Nasal/Sinus Congestion


   Last Admin: 11/09/18 04:24 Dose:  60 mg











- Labs


Labs: 


                                        





                                 11/09/18 08:29 





                                 11/09/18 08:29 











- Constitutional


Appears: Well





- Head Exam


Head Exam: ATRAUMATIC, NORMAL INSPECTION, NORMOCEPHALIC





- Eye Exam


Eye Exam: EOMI, Normal appearance, PERRL


Pupil Exam: NORMAL ACCOMODATION, PERRL





- ENT Exam


ENT Exam: Mucous Membranes Moist, Normal Exam





- Neck Exam


Neck Exam: Full ROM, Normal Inspection.  absent: Lymphadenopathy





- Respiratory Exam


Respiratory Exam: Decreased Breath Sounds





- Cardiovascular Exam


Cardiovascular Exam: REGULAR RHYTHM, +S1, +S2





- GI/Abdominal Exam


GI & Abdominal Exam: Soft, Diminished Bowel Sounds





- Rectal Exam


Rectal Exam: Deferred

## 2018-11-12 VITALS
TEMPERATURE: 98.2 F | OXYGEN SATURATION: 97 % | DIASTOLIC BLOOD PRESSURE: 82 MMHG | SYSTOLIC BLOOD PRESSURE: 137 MMHG | HEART RATE: 82 BPM

## 2018-11-12 RX ADMIN — HUMAN INSULIN SCH UNITS: 100 INJECTION, SOLUTION SUBCUTANEOUS at 11:39

## 2018-11-12 RX ADMIN — HUMAN INSULIN SCH UNITS: 100 INJECTION, SUSPENSION SUBCUTANEOUS at 08:20

## 2018-11-12 RX ADMIN — HUMAN INSULIN SCH UNITS: 100 INJECTION, SOLUTION SUBCUTANEOUS at 08:21

## 2018-11-12 RX ADMIN — ENOXAPARIN SODIUM SCH MG: 40 INJECTION SUBCUTANEOUS at 09:44

## 2018-11-12 NOTE — CP.PCM.PN
Subjective





- Date & Time of Evaluation


Date of Evaluation: 11/12/18


Time of Evaluation: 08:00





- Subjective


Subjective: 


clinically same





Objective





- Vital Signs/Intake and Output


Vital Signs (last 24 hours): 


                                        











Temp Pulse Resp BP Pulse Ox


 


 98.2 F   82   20   137/82   97 


 


 11/12/18 09:05  11/12/18 09:05  11/12/18 09:05  11/12/18 09:05  11/12/18 09:05











- Medications


Medications: 


                               Current Medications





Acetaminophen (Tylenol 325mg Tab)  650 mg PO Q4H PRN


   PRN Reason: Fever greater than 101 F


Al Hydrox/Mg Hydrox/Simethicone (Maalox 30 Ml)  30 ml PO TID PRN


   PRN Reason: Indigestion / Heartburn


Clonidine HCl (Catapres)  0.1 mg PO Q8 PRN


   PRN Reason: COWS Score More or Equal to 5


   Last Admin: 11/09/18 02:40 Dose:  0.1 mg


Dextrose (Dextrose 50% Inj)  0 ml IV STAT PRN; Protocol


   PRN Reason: Hypoglycemia Protocol


Dextrose (Glutose 15)  0 gm PO ONCE PRN; Protocol


   PRN Reason: Hypoglycemia Protocol


Enoxaparin Sodium (Lovenox)  40 mg SC DAILY [facility]


   Last Admin: 11/12/18 09:44 Dose:  40 mg


Glucagon (Glucagen Diagnostic Kit)  0 mg IM STAT PRN; Protocol


   PRN Reason: Hypoglycemia Protocol


Dextrose (Dextrose 5% In Water 1000 Ml)  1,000 mls @ 0 mls/hr IV .Q0M PRN; 

Protocol


   PRN Reason: Hypoglycemia Protocol


Insulin Human Isoph/Insulin Regular (Novolin 70/30 (70/30 Units/Ml) 10 Ml)  20 

units SC ACBD [facility]


   Last Admin: 11/12/18 08:20 Dose:  20 units


Insulin Human Regular (Novolin R)  0 unit SC ACHS [facility]; Protocol


   Last Admin: 11/12/18 11:39 Dose:  8 units


Loperamide HCl (Imodium)  2 mg PO Q8 PRN


   PRN Reason: Diarrhea


Ondansetron HCl (Zofran Tab)  4 mg PO Q8 PRN


   PRN Reason: Nausea/Vomiting


Pantoprazole Sodium (Protonix Inj)  40 mg IVP DAILY [facility]


   Last Admin: 11/12/18 09:44 Dose:  40 mg


Pseudoephedrine HCl (Sudafed Tab)  60 mg PO QID PRN


   PRN Reason: Nasal/Sinus Congestion


   Last Admin: 11/09/18 04:24 Dose:  60 mg











- Labs


Labs: 


                                        





                                 11/09/18 08:29 





                                 11/09/18 08:29 











- Constitutional


Appears: Well





- Head Exam


Head Exam: ATRAUMATIC, NORMAL INSPECTION, NORMOCEPHALIC





- Eye Exam


Eye Exam: EOMI, Normal appearance, PERRL


Pupil Exam: NORMAL ACCOMODATION, PERRL





- ENT Exam


ENT Exam: Mucous Membranes Moist, Normal Exam





- Neck Exam


Neck Exam: Full ROM, Normal Inspection.  absent: Lymphadenopathy





- Respiratory Exam


Respiratory Exam: Decreased Breath Sounds





- Cardiovascular Exam


Cardiovascular Exam: REGULAR RHYTHM, +S1, +S2





- GI/Abdominal Exam


GI & Abdominal Exam: Soft, Diminished Bowel Sounds





- Rectal Exam


Rectal Exam: Deferred